# Patient Record
Sex: MALE | Race: WHITE | URBAN - METROPOLITAN AREA
[De-identification: names, ages, dates, MRNs, and addresses within clinical notes are randomized per-mention and may not be internally consistent; named-entity substitution may affect disease eponyms.]

---

## 2017-01-17 ENCOUNTER — TELEPHONE (OUTPATIENT)
Dept: FAMILY MEDICINE CLINIC | Age: 47
End: 2017-01-17

## 2017-01-17 DIAGNOSIS — Z13.1 SCREENING FOR DIABETES MELLITUS (DM): ICD-10-CM

## 2017-01-17 DIAGNOSIS — Z13.21 ENCOUNTER FOR VITAMIN DEFICIENCY SCREENING: ICD-10-CM

## 2017-01-17 DIAGNOSIS — Z13.6 SCREENING, ISCHEMIC HEART DISEASE: ICD-10-CM

## 2017-01-17 DIAGNOSIS — Z13.0 SCREENING FOR ENDOCRINE, NUTRITIONAL, METABOLIC AND IMMUNITY DISORDER: Primary | ICD-10-CM

## 2017-01-17 DIAGNOSIS — Z13.228 SCREENING FOR ENDOCRINE, NUTRITIONAL, METABOLIC AND IMMUNITY DISORDER: Primary | ICD-10-CM

## 2017-01-17 DIAGNOSIS — Z13.89 SCREENING FOR BLOOD OR PROTEIN IN URINE: ICD-10-CM

## 2017-01-17 DIAGNOSIS — Z13.29 THYROID DISORDER SCREEN: ICD-10-CM

## 2017-01-17 DIAGNOSIS — Z13.29 SCREENING FOR ENDOCRINE, NUTRITIONAL, METABOLIC AND IMMUNITY DISORDER: Primary | ICD-10-CM

## 2017-01-17 DIAGNOSIS — Z13.220 SCREENING, LIPID: ICD-10-CM

## 2017-01-17 DIAGNOSIS — Z13.21 SCREENING FOR ENDOCRINE, NUTRITIONAL, METABOLIC AND IMMUNITY DISORDER: Primary | ICD-10-CM

## 2017-01-19 NOTE — TELEPHONE ENCOUNTER
Fasting labs to be done 1 week prior to visit. Nothing but water and meds for 8 hours prior to lab draw.       Future Appointments  Date Time Provider Jodi Valdovinos   1/26/2017 9:00 AM Greg Russell MD [facility]        Orders Placed This Encounter    CBC WITH AUTOMATED DIFF     Standing Status:   Future     Standing Expiration Date:   8/36/2106    METABOLIC PANEL, COMPREHENSIVE     Standing Status:   Future     Standing Expiration Date:   7/19/2017    HEMOGLOBIN A1C WITH EAG     Standing Status:   Future     Standing Expiration Date:   1/20/2018    LIPID PANEL     Standing Status:   Future     Standing Expiration Date:   1/20/2018    URINALYSIS W/ RFLX MICROSCOPIC     Standing Status:   Future     Standing Expiration Date:   1/20/2018    TSH AND FREE T4     Standing Status:   Future     Standing Expiration Date:   1/20/2018    VITAMIN D, 25 HYDROXY     Standing Status:   Future     Standing Expiration Date:   1/20/2018

## 2017-01-24 ENCOUNTER — HOSPITAL ENCOUNTER (OUTPATIENT)
Dept: LAB | Age: 47
Discharge: HOME | End: 2017-01-24
Payer: OTHER GOVERNMENT

## 2017-01-24 DIAGNOSIS — Z13.21 SCREENING FOR ENDOCRINE, NUTRITIONAL, METABOLIC AND IMMUNITY DISORDER: ICD-10-CM

## 2017-01-24 DIAGNOSIS — Z13.0 SCREENING FOR ENDOCRINE, NUTRITIONAL, METABOLIC AND IMMUNITY DISORDER: ICD-10-CM

## 2017-01-24 DIAGNOSIS — Z13.220 SCREENING, LIPID: ICD-10-CM

## 2017-01-24 DIAGNOSIS — Z13.29 SCREENING FOR ENDOCRINE, NUTRITIONAL, METABOLIC AND IMMUNITY DISORDER: ICD-10-CM

## 2017-01-24 DIAGNOSIS — Z13.89 SCREENING FOR BLOOD OR PROTEIN IN URINE: ICD-10-CM

## 2017-01-24 DIAGNOSIS — Z13.1 SCREENING FOR DIABETES MELLITUS (DM): ICD-10-CM

## 2017-01-24 DIAGNOSIS — Z13.228 SCREENING FOR ENDOCRINE, NUTRITIONAL, METABOLIC AND IMMUNITY DISORDER: ICD-10-CM

## 2017-01-24 DIAGNOSIS — Z13.29 THYROID DISORDER SCREEN: ICD-10-CM

## 2017-01-24 DIAGNOSIS — Z13.21 ENCOUNTER FOR VITAMIN DEFICIENCY SCREENING: ICD-10-CM

## 2017-01-24 DIAGNOSIS — Z13.6 SCREENING, ISCHEMIC HEART DISEASE: ICD-10-CM

## 2017-01-24 LAB
25(OH)D3 SERPL-MCNC: 24.7 NG/ML (ref 30–100)
ALBUMIN SERPL BCP-MCNC: 4.5 G/DL (ref 3.4–5)
ALBUMIN/GLOB SERPL: 1.7 {RATIO} (ref 0.8–1.7)
ALP SERPL-CCNC: 58 U/L (ref 45–117)
ALT SERPL-CCNC: 34 U/L (ref 16–61)
ANION GAP BLD CALC-SCNC: 9 MMOL/L (ref 3–18)
APPEARANCE UR: CLEAR
AST SERPL W P-5'-P-CCNC: 19 U/L (ref 15–37)
BASOPHILS # BLD AUTO: 0 K/UL (ref 0–0.06)
BASOPHILS # BLD: 0 % (ref 0–2)
BILIRUB SERPL-MCNC: 0.8 MG/DL (ref 0.2–1)
BILIRUB UR QL: NEGATIVE
BUN SERPL-MCNC: 17 MG/DL (ref 7–18)
BUN/CREAT SERPL: 23 (ref 12–20)
CALCIUM SERPL-MCNC: 8.6 MG/DL (ref 8.5–10.1)
CHLORIDE SERPL-SCNC: 104 MMOL/L (ref 100–108)
CHOLEST SERPL-MCNC: 180 MG/DL
CO2 SERPL-SCNC: 27 MMOL/L (ref 21–32)
COLOR UR: YELLOW
CREAT SERPL-MCNC: 0.73 MG/DL (ref 0.6–1.3)
DIFFERENTIAL METHOD BLD: ABNORMAL
EOSINOPHIL # BLD: 0.2 K/UL (ref 0–0.4)
EOSINOPHIL NFR BLD: 4 % (ref 0–5)
ERYTHROCYTE [DISTWIDTH] IN BLOOD BY AUTOMATED COUNT: 13.6 % (ref 11.6–14.5)
EST. AVERAGE GLUCOSE BLD GHB EST-MCNC: 111 MG/DL
GLOBULIN SER CALC-MCNC: 2.7 G/DL (ref 2–4)
GLUCOSE SERPL-MCNC: 93 MG/DL (ref 74–99)
GLUCOSE UR STRIP.AUTO-MCNC: NEGATIVE MG/DL
HBA1C MFR BLD: 5.5 % (ref 4.2–5.6)
HCT VFR BLD AUTO: 44.9 % (ref 36–48)
HDLC SERPL-MCNC: 61 MG/DL (ref 40–60)
HDLC SERPL: 3 {RATIO} (ref 0–5)
HGB BLD-MCNC: 14.9 G/DL (ref 13–16)
HGB UR QL STRIP: NEGATIVE
KETONES UR QL STRIP.AUTO: NEGATIVE MG/DL
LDLC SERPL CALC-MCNC: 94.2 MG/DL (ref 0–100)
LEUKOCYTE ESTERASE UR QL STRIP.AUTO: NEGATIVE
LIPID PROFILE,FLP: ABNORMAL
LYMPHOCYTES # BLD AUTO: 41 % (ref 21–52)
LYMPHOCYTES # BLD: 1.8 K/UL (ref 0.9–3.6)
MCH RBC QN AUTO: 29.9 PG (ref 24–34)
MCHC RBC AUTO-ENTMCNC: 33.2 G/DL (ref 31–37)
MCV RBC AUTO: 90 FL (ref 74–97)
MONOCYTES # BLD: 0.5 K/UL (ref 0.05–1.2)
MONOCYTES NFR BLD AUTO: 11 % (ref 3–10)
NEUTS SEG # BLD: 2 K/UL (ref 1.8–8)
NEUTS SEG NFR BLD AUTO: 44 % (ref 40–73)
NITRITE UR QL STRIP.AUTO: NEGATIVE
PH UR STRIP: 6.5 [PH] (ref 5–8)
PLATELET # BLD AUTO: 162 K/UL (ref 135–420)
PMV BLD AUTO: 13 FL (ref 9.2–11.8)
POTASSIUM SERPL-SCNC: 3.9 MMOL/L (ref 3.5–5.5)
PROT SERPL-MCNC: 7.2 G/DL (ref 6.4–8.2)
PROT UR STRIP-MCNC: NEGATIVE MG/DL
RBC # BLD AUTO: 4.99 M/UL (ref 4.7–5.5)
SODIUM SERPL-SCNC: 140 MMOL/L (ref 136–145)
SP GR UR REFRACTOMETRY: 1.01 (ref 1–1.03)
T4 FREE SERPL-MCNC: 1.3 NG/DL (ref 0.7–1.5)
TRIGL SERPL-MCNC: 124 MG/DL (ref ?–150)
TSH SERPL DL<=0.05 MIU/L-ACNC: 0.96 UIU/ML (ref 0.36–3.74)
UROBILINOGEN UR QL STRIP.AUTO: 0.2 EU/DL (ref 0.2–1)
VLDLC SERPL CALC-MCNC: 24.8 MG/DL
WBC # BLD AUTO: 4.5 K/UL (ref 4.6–13.2)

## 2017-01-24 PROCEDURE — 80061 LIPID PANEL: CPT | Performed by: INTERNAL MEDICINE

## 2017-01-24 PROCEDURE — 83036 HEMOGLOBIN GLYCOSYLATED A1C: CPT | Performed by: INTERNAL MEDICINE

## 2017-01-24 PROCEDURE — 36415 COLL VENOUS BLD VENIPUNCTURE: CPT | Performed by: INTERNAL MEDICINE

## 2017-01-24 PROCEDURE — 80053 COMPREHEN METABOLIC PANEL: CPT | Performed by: INTERNAL MEDICINE

## 2017-01-24 PROCEDURE — 85025 COMPLETE CBC W/AUTO DIFF WBC: CPT | Performed by: INTERNAL MEDICINE

## 2017-01-24 PROCEDURE — 81003 URINALYSIS AUTO W/O SCOPE: CPT | Performed by: INTERNAL MEDICINE

## 2017-01-24 PROCEDURE — 84439 ASSAY OF FREE THYROXINE: CPT | Performed by: INTERNAL MEDICINE

## 2017-01-24 PROCEDURE — 82306 VITAMIN D 25 HYDROXY: CPT | Performed by: INTERNAL MEDICINE

## 2017-01-24 NOTE — PROGRESS NOTES
PRE-VISIT PLANNING:     Future Appointments  Date Time Provider Jodi Santosi   2017 9:00 AM Elizabeth Matt MD [facility] is a patient of Elizabeth Matt MD who is scheduled for an office visit with Elizabeth Matt MD on 2017 for preventive visit. Encounter opened prior to visit to complete pre-visit planning, update and review pertinent sections in the chart. Last office visit with PCP was 9/10/15. Rooming Nurse Items:  -- Offer TDAP and flu shot    Pending items for provider to review with patient:  -- Completed labs 2017 in anticipation of visit. Health Maintenance Due   Topic Date Due    DTaP/Tdap/Td series (1 - Tdap) 1992    INFLUENZA AGE 9 TO ADULT  2016          Preventive Visit - Internal [address] [address] [facility] [address]    Date of Service:  [unfilled]  Patient's Name: Dayanna Steel   Patient's :  1970     Subjective/Discussion:       Chief Complaint   Patient presents with   [facility] Physical        Dayanna Steel is a 40 y.o. male seen today for routine preventive visit/annual physical.  He  has a past medical history of Environmental allergies; Exertional dyspnea (2014); Histoplasmosis; and Testicular/scrotal pain (2014). Labs from 2017 show low vitamin D and slightly low white blood cell count. Exercising regularly, lifts weights daily and runs 1.5-3 miles every other day. Still having vas deferens pain, severity waxes and wanes. Seen by urology several times. Not ready to reverse vasectomy (his wife is premenopausal and does not want to go on hormonal contraceptive). Gets water in ears frequently, swims a lot, work takes him to [employer] frequently and he likes swimming in the ocean. Hx of histoplasmosis (grew up in [address] [place] [address] Road), noted hilar LAD on previous CXRs.   Does have remote hx of smoking, quit before age 28. Wife noted a lipoma on his back. Not painful or sore.       Review of Systems (positives in bold)   CONST:   fatigue, malaise, unintentional weight change, appetite change, fevers, chills, rigors  NEURO:   headaches, auras, vision changes, seizures,     dizziness, lightheadeness, orthostasis, loss of consciousness, confusion    numbness/tingling/sensory change, focal weakness, new gait difficulty/imbalance  HEENT:  itchy eyes, runny eyes, red eyes, eye watering or discharge,     vision changes, light sensitivity, double vision    ear pain, ear pressure/popping, ear discharge/drainage, hearing change, sensation of fluid in ears,    nosebleeds, sneezing, runny nose, nasal congestion, postnasal drip, altered sense of smell    sore throat, dry mouth,voice change, hoarse voice,      jaw pain, jaw popping, toothache, dysgeusia    difficulty swallowing, oral ulcers or canker sores  CV:      chest pain, palpitations, chest wall pain, orthopnea, PND, edema  PULM:  SOB, wheezing, cough, sputum production, hemoptysis  GI:             nausea, vomiting, dry heaves, abdominal pain,     diarrhea, constipation, greasy stools, blood in stool, pale stools  :       dysuria, frequency, urgency, hematuria, incontinence, flank pain, scrotal pain,     decreased urine output, dark urine color, malodorous urine, kidney stones  MS:      focal muscle pain, myalgias, soft tissue swelling,    focal joint pain, diffuse joint aches, joint swelling/redness,     decreased ROM, joint instability, joint crepitus    neck pain, back pain  SKIN:        rashes, itching, vesicles, pustules,     cuts, open sores, nonhealing wounds, drainage, skin growth,    acne, rosacea, changing moles or growths, skin tags, warts  ALLERGY: seasonal allergies, itchy eyes, watery eyes, red eyes,     itchy ears, ear pain/pressure, ear popping,     runny nose, sneezing, postnasal drip, sore throat  HEME:  easy bleeding/bruising, bleeding from gums, history of DVT or PE, history of ICH  PSYCH: abnormal mood swings, anxiety, insomnia, hallucinations, anhedonia,       depression, suicidal ideation, homicidal ideation, feelings of hopelessness    substance dependence or abuse, disordered eating, irritability,    difficulty focusing, distractibility, obsessions, compulsions, repetitious behaviors     Health Maintenance   Topic Date Due    DTaP/Tdap/Td series (1 - Tdap) 04/19/1992    INFLUENZA AGE 9 TO ADULT  08/01/2016       Hospital Outpatient Visit on 01/24/2017   Component Date Value Ref Range Status    WBC 01/24/2017 4.5* 4.6 - 13.2 K/uL Final    RBC 01/24/2017 4.99  4.70 - 5.50 M/uL Final    HGB 01/24/2017 14.9  13.0 - 16.0 g/dL Final    HCT 01/24/2017 44.9  36.0 - 48.0 % Final    MCV 01/24/2017 90.0  74.0 - 97.0 FL Final    MCH 01/24/2017 29.9  24.0 - 34.0 PG Final    MCHC 01/24/2017 33.2  31.0 - 37.0 g/dL Final    RDW 01/24/2017 13.6  11.6 - 14.5 % Final    PLATELET 51/81/6673 880  135 - 420 K/uL Final    MPV 01/24/2017 13.0* 9.2 - 11.8 FL Final    NEUTROPHILS 01/24/2017 44  40 - 73 % Final    LYMPHOCYTES 01/24/2017 41  21 - 52 % Final    MONOCYTES 01/24/2017 11* 3 - 10 % Final    EOSINOPHILS 01/24/2017 4  0 - 5 % Final    BASOPHILS 01/24/2017 0  0 - 2 % Final    ABS. NEUTROPHILS 01/24/2017 2.0  1.8 - 8.0 K/UL Final    ABS. LYMPHOCYTES 01/24/2017 1.8  0.9 - 3.6 K/UL Final    ABS. MONOCYTES 01/24/2017 0.5  0.05 - 1.2 K/UL Final    ABS. EOSINOPHILS 01/24/2017 0.2  0.0 - 0.4 K/UL Final    ABS.  BASOPHILS 01/24/2017 0.0  0.0 - 0.06 K/UL Final    DF 01/24/2017 AUTOMATED    Final    Sodium 01/24/2017 140  136 - 145 mmol/L Final    Potassium 01/24/2017 3.9  3.5 - 5.5 mmol/L Final    Chloride 01/24/2017 104  100 - 108 mmol/L Final    CO2 01/24/2017 27  21 - 32 mmol/L Final    Anion gap 01/24/2017 9  3.0 - 18 mmol/L Final    Glucose 01/24/2017 93  74 - 99 mg/dL Final    BUN 01/24/2017 17  7.0 - 18 MG/DL Final    Creatinine 01/24/2017 0.73  0.6 - 1.3 MG/DL Final    BUN/Creatinine ratio 01/24/2017 23* 12 - 20   Final    GFR est AA 01/24/2017 >60  >60 ml/min/1.73m2 Final    GFR est non-AA 01/24/2017 >60  >60 ml/min/1.73m2 Final    Comment: (NOTE)  Estimated GFR is calculated using the Modification of Diet in Renal   Disease (MDRD) Study equation, reported for both  Americans   (GFRAA) and non- Americans (GFRNA), and normalized to 1.73m2   body surface area. The physician must decide which value applies to   the patient. The MDRD study equation should only be used in   individuals age 25 or older. It has not been validated for the   following: pregnant women, patients with serious comorbid conditions,   or on certain medications, or persons with extremes of body size,   muscle mass, or nutritional status.  Calcium 01/24/2017 8.6  8.5 - 10.1 MG/DL Final    Bilirubin, total 01/24/2017 0.8  0.2 - 1.0 MG/DL Final    ALT 01/24/2017 34  16 - 61 U/L Final    AST 01/24/2017 19  15 - 37 U/L Final    Alk. phosphatase 01/24/2017 58  45 - 117 U/L Final    Protein, total 01/24/2017 7.2  6.4 - 8.2 g/dL Final    Albumin 01/24/2017 4.5  3.4 - 5.0 g/dL Final    Globulin 01/24/2017 2.7  2.0 - 4.0 g/dL Final    A-G Ratio 01/24/2017 1.7  0.8 - 1.7   Final    Hemoglobin A1c 01/24/2017 5.5  4.2 - 5.6 % Final    Comment: (NOTE)  HbA1C Interpretive Ranges  <5.7              Normal  5.7 - 6.4         Consider Prediabetes  >6.5              Consider Diabetes      Est. average glucose 01/24/2017 111  mg/dL Final    Comment: (NOTE)  The eAG should be interpreted with patient characteristics in mind   since ethnicity, interindividual differences, red cell lifespan,   variation in rates of glycation, etc. may affect the validity of the   calculation.       LIPID PROFILE 01/24/2017        Final    Cholesterol, total 01/24/2017 180  <200 MG/DL Final    Triglyceride 01/24/2017 124  <150 MG/DL Final    Comment: The drugs N-acetylcysteine (NAC) and  Metamiszole have been found to cause falsely  low results in this chemical assay. Please  be sure to submit blood samples obtained  BEFORE administration of either of these  drugs to assure correct results.  HDL Cholesterol 01/24/2017 61* 40 - 60 MG/DL Final    LDL, calculated 01/24/2017 94.2  0 - 100 MG/DL Final    VLDL, calculated 01/24/2017 24.8  MG/DL Final    CHOL/HDL Ratio 01/24/2017 3.0  0 - 5.0   Final    Color 01/24/2017 YELLOW    Final    Appearance 01/24/2017 CLEAR    Final    Specific gravity 01/24/2017 1.013  1.005 - 1.030   Final    pH (UA) 01/24/2017 6.5  5.0 - 8.0   Final    Protein 01/24/2017 NEGATIVE   NEG mg/dL Final    Glucose 01/24/2017 NEGATIVE   NEG mg/dL Final    Ketone 01/24/2017 NEGATIVE   NEG mg/dL Final    Bilirubin 01/24/2017 NEGATIVE   NEG   Final    Blood 01/24/2017 NEGATIVE   NEG   Final    Urobilinogen 01/24/2017 0.2  0.2 - 1.0 EU/dL Final    Nitrites 01/24/2017 NEGATIVE   NEG   Final    Leukocyte Esterase 01/24/2017 NEGATIVE   NEG   Final    TSH 01/24/2017 0.96  0.36 - 3.74 uIU/mL Final    T4, Free 01/24/2017 1.3  0.7 - 1.5 NG/DL Final    Vitamin D 25-Hydroxy 01/24/2017 24.7* 30 - 100 ng/mL Final    Comment: (NOTE)  Deficiency               <20 ng/mL  Insufficiency          20-30 ng/mL  Sufficient             ng/mL  Possible toxicity       >100 ng/mL    The Method used is Siemens Advia Centaur currently standardized to a   Center of Disease Control and Prevention (CDC) certified reference   [facility]. Samples containing fluorescein dye can produce falsely   elevated values when tested with the ADVIA Centaur Vitamin D Assay. It is recommended that results in the toxic range, >100 ng/mL, be   retested 72 hours post fluorescein exposure.          Physical Assessment:   VS:    Visit Vitals    /84    Pulse 63    Temp 98 °F (36.7 °C) (Oral)    Resp 16    Ht 6' 2\" (1.88 m)    Wt 227 lb (103 kg)    SpO2 95%    BMI 29.15 kg/m2     Vision Screening Comments: Pt has eye's checked every 6 months. General:   Pleasant male well-nourished, well-groomed,  conversant, alert, in no acute distress. Head:  Normocephalic, atraumatic  Ears:  External ears WNL, no pain with movement of pinna, no TTP of mastoid processes    External auditory canals are clear    TMs WNL bilaterally with no bulging, or erythema, no medial effusions present  Eyes:  EOMI, PERRL, no pain with eye movements    No conjunctival injection, abnormal tearing, discharge, chemosis  Mouth:  MMM, o/p WNL without membranes, exudates, ulcerations or petechiae  Nose:  External nares WNL  Neck:  Neck supple with normal ROM for age, no thyromegaly or nodules    no LAD  Cardiovasc:   Regular rate and rhythm, no murmurs, no rubs, no gallops,  Pulmonary:   Clear breath sounds bilaterally, good air movement    No wheezing, no rales, no rhonchi, normal respiratory effort  Abdomen:   Abdomen soft, nontender, nondistended, NABS, no masses  Extremities:   No edema, no tenderness with palpation of calves, warm and well-perfused distally    No synovitis or pain with ROM of knees, ankles, hips, wrists, elbows, shoulders. Neuro:   Alert, conversant, appropriate, following commands, no focal deficits. DTRs 2+/symmetric at biceps, BRs, knees, ankles    Strength testing 5/5 in all major muscle groups    Gait/balance normal     Sensation normal to light touch distally  Skin:    No rashes noted. Psych:  Affect is pleasant, mood and judgment appropriate, facies congruent with affect,    Thought process demonstrated is linear and logical    Assessment/Plan:       Crystal Wagner was seen today for routine preventive visit. Follow up CXR for hilar LAD noted on previous imaging, not mentioned by radiologist.  Hx of histoplasmosis. No new breathing symptoms. Labs and recommendations reviewed with the patient.    Reassured patient re: benign nature of lipoma, if becomes symptomatic he will let me know and I will refer to surgery for removal.  Patient opting to defer vasectomy reversal for now. He will schedule with urology if he changes his mind. Age appropriate preventive screenings/measures discussed with patient. Follow up in 3-6 months for recheck of blood counts and vitamin D, yearly for preventive visit, sooner PRN. ICD-10-CM ICD-9-CM    1. Routine adult health maintenance Z00.00 V70.0    2. Scrotal pain N50.82 608.9    3. Histoplasmosis B39.9 115.90 XR CHEST PA LAT   4. Vitamin D deficiency E55.9 268.9 cholecalciferol (VITAMIN D3) 5,000 unit capsule   5. Leukopenia, unspecified type D72.819 288.50    6. Encounter for immunization Z23 V03.89 INFLUENZA VIRUS VAC QUAD,SPLIT,PRESV FREE SYRINGE 3/> YRS IM      TETANUS, DIPHTHERIA TOXOIDS AND ACELLULAR PERTUSSIS VACCINE (TDAP), IN INDIVIDS. >=7, IM      [place] IMMUNIZ ADMIN,1 SINGLE/COMB VAC/TOXOID       Body mass index is 29.15 kg/(m^2). Discussed the patient's above normal BMI with him. I have recommended the following interventions and follow up:  Weight loss from baseline weight. 30 minutes of cardiovascular exercise daily, reduction in simple carbohydrates, increase in dietary fiber, adequate water intake to stay hydrated/keep urine clear to light yellow. Follow up at next OV. The patient states understanding/agreement with the treatment plan. All questions were answered. Marcella Ricks MD - Internal Medicine  1/26/2017, 8:41 AM  3 [facility]    Patient Care Team:  Marcella Ricks MD as PCP - General (Internal Medicine)     Problem List:      Patient Active Problem List   Diagnosis Code    Testicular/scrotal pain [id number]    Exertional dyspnea R06.09    Histoplasmosis B39.9    Environmental allergies Z91.09       Medications:     No current outpatient prescriptions on file. No current facility-administered medications for this visit.         Additional History:     Past Medical History   Diagnosis Date    Environmental allergies     Exertional dyspnea 6/2/2014     ? related to Histo of lungs    Histoplasmosis      History of histoplasmosis of lung per patient, notes he has had abnormal XRays in [facility]    Testicular/scrotal pain 6/2/2014     Reversal of vasectomy recommended by [facility]     Past Surgical History   Procedure Laterality Date    Hx vasectomy  2012    Hx hernia repair  2013     bilateral with mesh    Hx heent  2006     wisdom extraction     Social History     Social History    Marital status:      Spouse name: N/A    Number of children: N/A    Years of education: N/A     Occupational History     for [employer]      Social History Main Topics    Smoking status: Never Smoker    Smokeless tobacco: Never Used    Alcohol use Yes      Comment: 2/wk    Drug use: No    Sexual activity: Yes     Partners: Female     Other Topics Concern    Not on file     Social History Narrative    8/14/2015:  Wife Jasmin Pérez is a NP. 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 Carole Drew on [facility].            Family History   Problem Relation Age of Onset    Diabetes Mother      Allergies   Allergen Reactions    Shellfish Derived Anaphylaxis    Amoxicillin Nausea Only     Stomach pain and upset            This document may have been created with the aid of dictation software. In spite of review and editing, text may contain errors, particularly phonetic errors.

## 2017-01-26 ENCOUNTER — OFFICE VISIT (OUTPATIENT)
Dept: FAMILY MEDICINE CLINIC | Age: 47
End: 2017-01-26

## 2017-01-26 VITALS
TEMPERATURE: 98 F | HEART RATE: 63 BPM | RESPIRATION RATE: 16 BRPM | BODY MASS INDEX: 29.13 KG/M2 | DIASTOLIC BLOOD PRESSURE: 84 MMHG | OXYGEN SATURATION: 95 % | SYSTOLIC BLOOD PRESSURE: 120 MMHG | HEIGHT: 74 IN | WEIGHT: 227 LBS

## 2017-01-26 DIAGNOSIS — D17.1 LIPOMA OF BACK: ICD-10-CM

## 2017-01-26 DIAGNOSIS — E55.9 VITAMIN D DEFICIENCY: ICD-10-CM

## 2017-01-26 DIAGNOSIS — D72.819 LEUKOPENIA, UNSPECIFIED TYPE: ICD-10-CM

## 2017-01-26 DIAGNOSIS — N50.82 SCROTAL PAIN: Chronic | ICD-10-CM

## 2017-01-26 DIAGNOSIS — Z23 ENCOUNTER FOR IMMUNIZATION: ICD-10-CM

## 2017-01-26 DIAGNOSIS — Z00.00 ROUTINE ADULT HEALTH MAINTENANCE: Primary | ICD-10-CM

## 2017-01-26 DIAGNOSIS — B39.9 HISTOPLASMOSIS: Chronic | ICD-10-CM

## 2017-01-26 DIAGNOSIS — R59.0 HILAR LYMPHADENOPATHY: ICD-10-CM

## 2017-01-26 RX ORDER — CHOLECALCIFEROL (VITAMIN D3) 125 MCG
5000 CAPSULE ORAL DAILY
Qty: 90 CAP | Refills: 3 | COMMUNITY
Start: 2017-01-26

## 2017-01-26 NOTE — MR AVS SNAPSHOT
Visit Information Date & Time Provider Department Dept. Phone Encounter #  
 1/26/2017  9:00 AM Samir Alex, 3 [facility] [phone] [id number] Upcoming Health Maintenance Date Due DTaP/Tdap/Td series (1 - Tdap) 4/19/1992 INFLUENZA AGE 9 TO ADULT 8/1/2016 Allergies as of 1/26/2017  Review Complete On: 1/26/2017 By: Samir Johnson MD  
  
 Severity Noted Reaction Type Reactions Shellfish Derived High 06/02/2014   Intolerance Anaphylaxis Amoxicillin  09/10/2015    Nausea Only Stomach pain and upset Current Immunizations  Reviewed on 1/24/2017 Name Date Influenza Vaccine (Quad) PF  Incomplete Tdap  Incomplete Reviewed by Samir Johnson MD on 1/24/2017 at  8:41 AM  
You Were Diagnosed With   
  
 Codes Comments Routine adult health maintenance    -  Primary ICD-10-CM: Z00.00 ICD-9-CM: V70.0 Scrotal pain     ICD-10-CM: N50.82 ICD-9-CM: 608.9 Histoplasmosis     ICD-10-CM: B39.9 ICD-9-CM: 115.90 Vitamin D deficiency     ICD-10-CM: E55.9 ICD-9-CM: 268.9 Leukopenia, unspecified type     ICD-10-CM: D72.819 ICD-9-CM: 288.50 Encounter for immunization     ICD-10-CM: J92 ICD-9-CM: V03.89 Vitals BP Pulse Temp Resp Height(growth percentile) Weight(growth percentile) 120/84 63 98 °F (36.7 °C) (Oral) 16 6' 2\" (1.88 m) 227 lb (103 kg) SpO2 BMI Smoking Status 95% 29.15 kg/m2 Never Smoker Vitals History BMI and BSA Data Body Mass Index Body Surface Area  
 29.15 kg/m 2 2.32 m 2 Preferred Pharmacy Pharmacy Name Phone [facility] [phone] Your Updated Medication List  
  
   
This list is accurate as of: 1/26/17  9:36 AM.  Always use your most recent med list.  
  
  
  
  
 cholecalciferol 5,000 unit capsule Commonly known as:  VITAMIN D3  
 Take 1 Cap by mouth daily. Indications: VITAMIN D DEFICIENCY We Performed the Following INFLUENZA VIRUS VAC QUAD,SPLIT,PRESV FREE SYRINGE 3/> YRS IM [id number] CPT(R)] [place] IMMUNIZ ADMIN,1 SINGLE/COMB VAC/TOXOID [id number] CPT(R)] TETANUS, DIPHTHERIA TOXOIDS AND ACELLULAR PERTUSSIS VACCINE (TDAP), IN INDIVIDS. >=7, IM [id number] CPT(R)] To-Do List   
 01/26/2017 Imaging:  XR CHEST PA LAT Patient Instructions STAY UP TO DATE WITH YOUR PREVENTIVE HEALTH:    
Please review the following recommendations and if you are not sure that your healthcare is up to date, ask your provider at your next scheduled office visit. -- Yearly preventive visits (sometimes called \"physicals\") are recommended for all adults. Medicare and Medicaid do not pay for physicals. Medicare covers a yearly wellness visit that differs in many ways from a traditional physical, but is an opportunity to make sure you are maximizing the preventive services that will be covered by Medicare. Each insurance carrier will have different regulations about what services may be covered, so be sure to talk with your insurance provider before scheduling a visit. -- Colon cancer screening is recommended for all patients 48 and older with either colonoscopy (interval will vary depending on results) or yearly stool testing.   
 
-- Mammogram is recommended every 2 years for women ages 36 to 76. -- Pap smear is recommended every 3-5 years for women aged 24 to 72, unless your cervix has been surgically removed for benign reasons. -- Flu shot is recommended every fall for adults of all ages. -- Prevnar 15 is recommended at the age of 72 for all adults. -- Pneumovax is recommended one year after Prevnar 13 for all adults, but earlier if you have a history of chronic health problems (including diabetes and asthma) or smoking. -- Tetanus boosters are recommended every 10 years for adults of all ages. Medicare and Medicaid do not cover tetanus as a preventive booster, but will pay for patients to receive a booster in the event of certain injuries. INSTRUCTIONS AFTER YOUR VISIT ON 1/26/2017  
 
-- You should start a once daily vitamin D supplement (5000 units daily), available over the counter. -- Plan to recheck your blood counts and Vitamin D level in 3-6 months. X-RAYS were ordered today. The  will direct you to our X-ray suite. The radiologist will provide a report in 24-48 hours. If you want a copy of your images, you can request a CD copy from the X-ray technician. -- Plan to start colonoscopy screening for colon cancer at age 48. TESTING RESULTS  
-- Lab results may become available for your review on [employer] before your provider has an opportunity to write you a note about results. Review of routine lab results will generally be done in 10-14 days. Please save your inquiries about results until your provider has had time to review them. -- If you have testing done outside of the office, please call to let us know the date and location that your testing was completed. Results can often be obtained faster if an inquiry is run. MEDICATION REFILLS   
 
-- Controlled substance refills (medications that require printed prescriptions for monitoring of use per [facility] guidelines) must be picked up in the office and per medical group policy will require a scheduled office visit with the provider. Calling the after hours answering service to request a controlled substance represents a violation of [facility] controlled substance policy. -- All other medication refills are to be requested by calling your pharmacy during regular office hours. The after hours physician on call is not required to respond to calls about medication refills.   It is your responsibility to keep track of when you may be running out of medication and to place refill requests at least 3 business days prior. [facility] Scheduling appointments can be done at the  or by calling [phone] and selecting option #1. HOLIDAY CLOSURES:  
 
The office is closed on six major holidays each year:  New Year's Day, July 4th, Memorial Day, Labor Day, Thanksgiving, and [place] Day. Lab and X-ray services are not available on those days. Introducing [address]! Carol Almonte introduces [employer] patient portal. Now you can access parts of your medical record, email your doctor's office, and request medication refills online. 1. In your internet browser, go to [email or web address] 2. Click on the First Time User? Click Here link in the Sign In box. You will see the New Member Sign Up page. 3. Enter your [employer] Access Code exactly as it appears below. You will not need to use this code after youve completed the sign-up process. If you do not sign up before the expiration date, you must request a new code. · [employer] Access Code: [email or web address] Expires: 4/26/2017  9:36 AM 
 
4. Enter the last four digits of your Social Security Number (xxxx) and Date of Birth (mm/dd/yyyy) as indicated and click Submit. You will be taken to the next sign-up page. 5. Create a [employer] ID. This will be your [employer] login ID and cannot be changed, so think of one that is secure and easy to remember. 6. Create a [employer] password. You can change your password at any time. 7. Enter your Password Reset Question and Answer. This can be used at a later time if you forget your password. 8. Enter your e-mail address. You will receive e-mail notification when new information is available in [address] 9. Click Sign Up. You can now view and download portions of your medical record. 10. Click the Download Summary menu link to download a portable copy of your medical information. If you have questions, please visit the Frequently Asked Questions section of the [employer] website. Remember, [employer] is NOT to be used for urgent needs. For medical emergencies, dial 911. Now available from your iPhone and Android! Please provide this summary of care documentation to your next provider. Your primary care clinician is listed as Alana Cannon. If you have any questions after today's visit, please call [phone].

## 2017-01-26 NOTE — PATIENT INSTRUCTIONS
STAY UP TO DATE WITH YOUR PREVENTIVE HEALTH:     Please review the following recommendations and if you are not sure that your healthcare is up to date, ask your provider at your next scheduled office visit. -- Yearly preventive visits (sometimes called \"physicals\") are recommended for all adults. Medicare and Medicaid do not pay for physicals. Medicare covers a yearly wellness visit that differs in many ways from a traditional physical, but is an opportunity to make sure you are maximizing the preventive services that will be covered by Medicare. Each insurance carrier will have different regulations about what services may be covered, so be sure to talk with your insurance provider before scheduling a visit. -- Colon cancer screening is recommended for all patients 48 and older with either colonoscopy (interval will vary depending on results) or yearly stool testing.      -- Mammogram is recommended every 2 years for women ages 36 to 76. -- Pap smear is recommended every 3-5 years for women aged 24 to 72, unless your cervix has been surgically removed for benign reasons. -- Flu shot is recommended every fall for adults of all ages. -- Prevnar 15 is recommended at the age of 72 for all adults. -- Pneumovax is recommended one year after Prevnar 13 for all adults, but earlier if you have a history of chronic health problems (including diabetes and asthma) or smoking. -- Tetanus boosters are recommended every 10 years for adults of all ages. Medicare and Medicaid do not cover tetanus as a preventive booster, but will pay for patients to receive a booster in the event of certain injuries. INSTRUCTIONS AFTER YOUR VISIT ON 1/26/2017     -- You should start a once daily vitamin D supplement (5000 units daily), available over the counter. -- Plan to recheck your blood counts and Vitamin D level in 3-6 months. X-RAYS were ordered today.   The  will direct you to our X-ray suite.  The radiologist will provide a report in 24-48 hours. If you want a copy of your images, you can request a CD copy from the X-ray technician. -- Plan to start colonoscopy screening for colon cancer at age 48. TESTING RESULTS   -- Lab results may become available for your review on Verid before your provider has an opportunity to write you a note about results. Review of routine lab results will generally be done in 10-14 days. Please save your inquiries about results until your provider has had time to review them. -- If you have testing done outside of the office, please call to let us know the date and location that your testing was completed. Results can often be obtained faster if an inquiry is run. MEDICATION REFILLS      -- Controlled substance refills (medications that require printed prescriptions for monitoring of use per [facility] guidelines) must be picked up in the office and per medical group policy will require a scheduled office visit with the provider. Calling the after hours answering service to request a controlled substance represents a violation of [facility] controlled substance policy. -- All other medication refills are to be requested by calling your pharmacy during regular office hours. The after hours physician on call is not required to respond to calls about medication refills. It is your responsibility to keep track of when you may be running out of medication and to place refill requests at least 3 business days prior. [facility]      Scheduling appointments can be done at the  or by calling [phone] and selecting option #1. HOLIDAY CLOSURES:     The office is closed on six major holidays each year:  New Year's Day, July 4th, Memorial Day, Labor Day, Thanksgiving, and Namita Day. Lab and X-ray services are not available on those days.

## 2017-01-26 NOTE — PROGRESS NOTES
Nancy Brownlee is a 40 y.o. male  Pt here today for physical visit. 1. Have you been to the ER, urgent care clinic since your last visit? Hospitalized since your last visit? No    2. Have you seen or consulted any other health care providers outside of the [address] [place] [address] since your last visit? Include any pap smears or colon screening.  No

## 2017-05-04 ENCOUNTER — OFFICE VISIT (OUTPATIENT)
Dept: FAMILY MEDICINE CLINIC | Age: 47
End: 2017-05-04

## 2017-05-04 VITALS
WEIGHT: 231 LBS | SYSTOLIC BLOOD PRESSURE: 126 MMHG | HEART RATE: 72 BPM | TEMPERATURE: 96.4 F | DIASTOLIC BLOOD PRESSURE: 86 MMHG | RESPIRATION RATE: 17 BRPM | BODY MASS INDEX: 29.65 KG/M2 | OXYGEN SATURATION: 96 % | HEIGHT: 74 IN

## 2017-05-04 DIAGNOSIS — S46.212A TRAUMATIC PARTIAL TEAR OF BICEPS TENDON, LEFT, INITIAL ENCOUNTER: Primary | ICD-10-CM

## 2017-05-04 PROBLEM — S46.119A TRAUMATIC PARTIAL TEAR OF BICEPS TENDON: Status: ACTIVE | Noted: 2017-05-04

## 2017-05-04 NOTE — PATIENT INSTRUCTIONS
-- Partial (grade 1) tear of left biceps tendon. -- No weight lifting for 4-6 weeks, expect pain to improve in 4-6 weeks. Use pain as your guide - if it hurts, stop doing it.

## 2017-05-04 NOTE — PROGRESS NOTES
PRE-VISIT PLANNING:     Future Appointments  Date Time Provider Jodi Valdovinos   2017 9:15 AM Demarcus Aldrich MD [facility] (PCP is Demarcus Aldrich MD) is scheduled for an office visit with Demarcus Aldrich MD on 2017 for patient c/o pain in left arm. Encounter opened prior to visit to complete pre-visit planning, update and review pertinent sections in the chart. Last office visit with PCP was 2017. Pending items for provider to review with patient:    There are no preventive care reminders to display for this patient. Internal Medicine/Primary Care  Acute Care Visit  [address] , [facility] [place] [address], [address] [place] [address]  Phone [phone]  Fax [phone]    Date of Service:  2017  Patient's Name: Lonnie Stapleton   Patient's :  1970     Subjective/Discussion:        Chief Complaint   Patient presents with    Arm Pain     Left        Lonnie Stapleton is a pleasant 56 y.o. male patient of Demarcus Aldrich MD presenting for evaluation of left biceps pain. 1 week ago had been lifting weights (bench pressing) and had increased weights - no issue during workout. After workout he was pulling cover off jacuzzi and arm was in a slightly awkward position - he heard a very loud snap in left forearm near elbow. No immediate pain. Had normal movement, never swelled up, never turned black and blue. In past few days has been having worsening pain in left biceps/upper arm, none in elbow joint, none in forearm, states ROM/movement preserved, but pain is worsening. Flexing/krishna bicep is painful.         Objective:     VS:    Visit Vitals    /86 (BP 1 Location: Left arm, BP Patient Position: Sitting)    Pulse 72    Temp 96.4 °F (35.8 °C) (Oral)    Resp 17    Ht 6' 2\" (1.88 m)    Wt 231 lb (104.8 kg)    SpO2 96%    BMI 29.66 kg/m2       General:   Pleasant age appropriate male, seated comfortably in exam room, appears well, well-groomed, conversant, alert, in no acute distress. Upper extremities: Relative weakness and pain with contraction of left biceps vs right, ?minor loss of biceps muscle definition/biciptal groove left side vs right, no palpable mass/torn muscle, moderate TTP along biceps muscle left arm, weakness and pain with resisted supination of left hand    Assessment/Plan:       Bryce Felipe was seen today for left biceps partial tendon tear, preserved ROM - nondominant arm. Rest, NSAIDS, icing - no weight lifting x 4-6 weeks. Traumatic partial tear of biceps tendon, left, initial encounter      The patient states understanding and agrees with the treatment plan. All questions were answered. Lise Bustos MD - Internal Medicine  5/4/2017, 7:56 AM    Patient Care Team:  Lise Bustso MD as PCP - General (Internal Medicine)      Additional History     Past Medical History:   Diagnosis Date    Environmental allergies     Exertional dyspnea 6/2/2014    ? related to Histo of lungs    Histoplasmosis     History of histoplasmosis of lung per patient, notes he has had abnormal XRays in [place] [facility]/[place]    Lipoma of back 01/26/2017    Small nontender 3 cm subcu mass palpable left mid-thoracic     Testicular/scrotal pain 6/2/2014    Reversal of vasectomy recommended by [facility] Traumatic partial tear of biceps tendon 5/4/2017     Past Surgical History:   Procedure Laterality Date    HX HEENT  2006    wisdom extraction    HX HERNIA REPAIR  2013    bilateral with mesh    HX VASECTOMY  2012     Social History   Substance Use Topics    Smoking status: Never Smoker    Smokeless tobacco: Never Used    Alcohol use Yes      Comment: 2/wk     Current Outpatient Prescriptions   Medication Sig    cholecalciferol (VITAMIN D3) 5,000 unit capsule Take 1 Cap by mouth daily.  Indications: VITAMIN D DEFICIENCY     No current facility-administered medications for this visit. Allergies   Allergen Reactions    Shellfish Derived Anaphylaxis    Amoxicillin Nausea Only     Stomach pain and upset            This document may have been created with the aid of dictation software. In spite of review and editing, text may contain errors, particularly phonetic errors.

## 2017-05-04 NOTE — PROGRESS NOTES
1. Have you been to the ER, urgent care clinic since your last visit? Hospitalized since your last visit? No    2. Have you seen or consulted any other health care providers outside of the [address] [place] [address] since your last visit? Include any pap smears or colon screening.  No

## 2017-05-04 NOTE — MR AVS SNAPSHOT
Visit Information Date & Time Provider Department Dept. Phone Encounter #  
 5/4/2017  9:15 AM Agapito Balderas, [place] [phone] [id number] Upcoming Health Maintenance Date Due Pneumococcal 19-64 Highest Risk (1 of 3 - PCV13) 5/4/2018* INFLUENZA AGE 9 TO ADULT 8/1/2017 DTaP/Tdap/Td series (2 - Td) 1/26/2027 *Topic was postponed. The date shown is not the original due date. Allergies as of 5/4/2017  Review Complete On: 5/4/2017 By: Agapito Balderas MD  
  
 Severity Noted Reaction Type Reactions Shellfish Derived High 06/02/2014   Intolerance Anaphylaxis Amoxicillin  09/10/2015    Nausea Only Stomach pain and upset Current Immunizations  Reviewed on 5/4/2017 Name Date Influenza Vaccine (Quad) PF 1/26/2017  9:40 AM  
 Tdap 1/26/2017  9:41 AM  
  
 Reviewed by Agapito Balderas MD on 5/4/2017 at  7:55 AM  
Vitals BP Pulse Temp Resp Height(growth percentile) Weight(growth percentile) 126/86 (BP 1 Location: Left arm, BP Patient Position: Sitting) 72 96.4 °F (35.8 °C) (Oral) 17 6' 2\" (1.88 m) 231 lb (104.8 kg) SpO2 BMI Smoking Status 96% 29.66 kg/m2 Never Smoker Vitals History BMI and BSA Data Body Mass Index Body Surface Area  
 29.66 kg/m 2 2.34 m 2 Preferred Pharmacy Pharmacy Name Phone [facility], [address] [phone] Your Updated Medication List  
  
   
This list is accurate as of: 5/4/17 10:20 AM.  Always use your most recent med list.  
  
  
  
  
 cholecalciferol 5,000 unit capsule Commonly known as:  VITAMIN D3 Take 1 Cap by mouth daily. Indications: VITAMIN D DEFICIENCY Patient Instructions -- Partial (grade 1) tear of left biceps tendon. -- No weight lifting for 4-6 weeks, expect pain to improve in 4-6 weeks. Use pain as your guide - if it hurts, stop doing it. Introducing [facility] & HEALTH SERVICES! Khoa Senior introduces [employer] patient portal. Now you can access parts of your medical record, email your doctor's office, and request medication refills online. 1. In your internet browser, go to [email or web address] and Playt 2. Click on the First Time User? Click Here link in the Sign In box. You will see the New Member Sign Up page. 3. Enter your [employer] Access Code exactly as it appears below. You will not need to use this code after youve completed the sign-up process. If you do not sign up before the expiration date, you must request a new code. · [employer] Access Code: [email or web address] Expires: 8/2/2017 10:19 AM 
 
4. Enter the last four digits of your Social Security Number (xxxx) and Date of Birth (mm/dd/yyyy) as indicated and click Submit. You will be taken to the next sign-up page. 5. Create a [employer] ID. This will be your [employer] login ID and cannot be changed, so think of one that is secure and easy to remember. 6. Create a [employer] password. You can change your password at any time. 7. Enter your Password Reset Question and Answer. This can be used at a later time if you forget your password. 8. Enter your e-mail address. You will receive e-mail notification when new information is available in [address] 9. Click Sign Up. You can now view and download portions of your medical record. 10. Click the Download Summary menu link to download a portable copy of your medical information. If you have questions, please visit the Frequently Asked Questions section of the [employer] website. Remember, [employer] is NOT to be used for urgent needs. For medical emergencies, dial 911. Now available from your iPhone and Android! Please provide this summary of care documentation to your next provider. Your primary care clinician is listed as Rue Kade. If you have any questions after today's visit, please call [phone].

## 2017-09-21 ENCOUNTER — OFFICE VISIT (OUTPATIENT)
Dept: FAMILY MEDICINE CLINIC | Age: 47
End: 2017-09-21

## 2017-09-21 VITALS
RESPIRATION RATE: 18 BRPM | DIASTOLIC BLOOD PRESSURE: 74 MMHG | BODY MASS INDEX: 29.49 KG/M2 | TEMPERATURE: 96.6 F | OXYGEN SATURATION: 96 % | SYSTOLIC BLOOD PRESSURE: 122 MMHG | HEART RATE: 75 BPM | HEIGHT: 74 IN | WEIGHT: 229.8 LBS

## 2017-09-21 DIAGNOSIS — R35.0 URINARY FREQUENCY: Primary | ICD-10-CM

## 2017-09-21 DIAGNOSIS — K06.8 PAIN IN GUMS: ICD-10-CM

## 2017-09-21 DIAGNOSIS — H92.02 EAR DISCOMFORT, LEFT: ICD-10-CM

## 2017-09-21 DIAGNOSIS — H61.23 BILATERAL IMPACTED CERUMEN: ICD-10-CM

## 2017-09-21 DIAGNOSIS — Z23 ENCOUNTER FOR IMMUNIZATION: ICD-10-CM

## 2017-09-21 LAB
BILIRUB UR QL STRIP: NEGATIVE
GLUCOSE UR-MCNC: NEGATIVE MG/DL
KETONES P FAST UR STRIP-MCNC: NEGATIVE MG/DL
PH UR STRIP: 6.5 [PH] (ref 4.6–8)
PROT UR QL STRIP: NEGATIVE MG/DL
SP GR UR STRIP: 1.01 (ref 1–1.03)
UA UROBILINOGEN AMB POC: NORMAL (ref 0.2–1)
URINALYSIS CLARITY POC: CLEAR
URINALYSIS COLOR POC: YELLOW
URINE BLOOD POC: NEGATIVE
URINE LEUKOCYTES POC: NEGATIVE
URINE NITRITES POC: NEGATIVE

## 2017-09-21 NOTE — PROGRESS NOTES
3 [facility]  Primary Care Office Visit - Problem-Oriented    : 1970   Ghanshyam Gonzalez is a 56 y.o. male presenting for  Chief Complaint   Patient presents with    Gum Problem     infection    Urinary Frequency    Referral Request     Urology wants to have prostate checked       Assessment/Plan:       ICD-10-CM ICD-9-CM   1. Urinary frequency - initial encounter, IPSS = 30  - referred to urology per patient request  - refrained from ordering PSA, will defer to urology     R35.0 788.41   2. Ear discomfort, left - initial encounter, likely 2/2 #3 H92.02 388.70   3. Bilateral impacted cerumen - initial encounter  - disimpacted manually  - advised cerumen softening techniques, avoid q-tip use     H61.23 380.4   4. Pain in gums - initial encounter  - no current evidence of infection  - recommend f/u with dentist if ongoing     K06.8 523.9   5. Encounter for immunization - flu shot Z23 V03.89       Orders Placed This Encounter    CULTURE, URINE     Standing Status:   Future     Standing Expiration Date:   2018    Influenza virus vaccine (QUADRIVALENT PRES FREE SYRINGE) IM (22516)    URINALYSIS W/MICROSCOPIC     Standing Status:   Future     Standing Expiration Date:   3/21/2018    REFERRAL TO UROLOGY     Referral Priority:   Routine     Referral Type:   Consultation     Referral Reason:   Specialty Services Required     Referred to Provider:   Illene Duane, MD    AMB POC URINALYSIS DIP STICK AUTO W/O MICRO         This document may have been created with the aid of dictation software. Text may contain errors, particularly phonetic errors. Reviewed management plan & instructions with patient, who voiced understanding.     Laureano Dumont MD  Internal Medicine, Family Medicine & Sports Medicine  2017, 7:56 AM    History:   Ghanshyam Gonzalez is a 56 y.o. male presenting to address:  Chief Complaint   Patient presents with    Gum Problem     infection    Urinary Frequency  Referral Request     Urology wants to have prostate checked       \"I have to pee all the time, I have to push harder to pee. \"  No dysuria. [id number] urination at night!! \"I never sleep well, obviously. 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 \"    Gum pain, on the left mandibular area, with pimple growing above it. On 9/13/2017 started amoxicillin (from dentist) x 3 pills, which gave him diarrhea, headache, etc.  Since then he has not taken any other antibiotics. No fevers or chills. + left ear discomfort, no pain  Uses earplugs when diving  Frequently in oceans & pools  Admits to q-tip use    I-PSS score = 30  Answered \"3\" for \"If you were to spend the rest of your life with your urinary condition just the way it is now, how would you feel about that? \"  [see scanned document]      Past Medical History:   Diagnosis Date    Environmental allergies     Exertional dyspnea 6/2/2014    ? related to Histo of lungs    Histoplasmosis     History of histoplasmosis of lung per patient, notes he has had abnormal [facility]Rays in [facility]    Lipoma of back 01/26/2017    Small nontender 3 cm subcu mass palpable left mid-thoracic     Testicular/scrotal pain 6/2/2014    Reversal of vasectomy recommended by [facility] Traumatic partial tear of biceps tendon 5/4/2017     Past Surgical History:   Procedure Laterality Date    HX HEENT  2006    wisdom extraction    HX HERNIA REPAIR  2013    bilateral with mesh    HX VASECTOMY  2012      reports that he has never smoked. He has never used smokeless tobacco. He reports that he drinks alcohol. He reports that he does not use illicit drugs. Social History     Social History Narrative    8/14/2015:  Wife Malia Gonzalez is a NP. 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 Brianna Nelson on [facility].            History   Smoking Status    Never Smoker   Smokeless Tobacco    Never Used     Family History   Problem Relation Age of Onset    Diabetes Mother      Allergies   Allergen Reactions    Shellfish Derived Anaphylaxis    Amoxicillin Nausea Only     Stomach pain and upset       Problem List:      Patient Active Problem List    Diagnosis    Traumatic partial tear of biceps tendon    Lipoma of back    Scrotal pain    Exertional dyspnea    Histoplasmosis     History of histoplasmosis of lung per patient, notes he has had abnormal XRays in [facility]      Environmental allergies       Medications:     Current Outpatient Prescriptions   Medication Sig    cholecalciferol (VITAMIN D3) 5,000 unit capsule Take 1 Cap by mouth daily. Indications: VITAMIN D DEFICIENCY     No current facility-administered medications for this visit. Review of Systems:     Review of Systems   Constitutional: Negative for chills and fever. HENT: Positive for ear pain (left). Gastrointestinal: Negative for abdominal pain, blood in stool and constipation. Genitourinary: Positive for frequency. Negative for dysuria, flank pain, hematuria and urgency. Physical Assessment:   VS:    Vitals:    09/21/17 0738 09/21/17 0751   BP: 141/80 122/74   Pulse: 75    Resp: 18    Temp: 96.6 °F (35.9 °C)    TempSrc: Oral    SpO2: 96%    Weight: 229 lb 12.8 oz (104.2 kg)    Height: 6' 2\" (1.88 m)    PainSc:   0 - No pain        Physical Exam   Constitutional: He is oriented to person, place, and time. He appears well-developed and well-nourished. No distress. HENT:   Head: Normocephalic and atraumatic. Right Ear: External ear normal. A foreign body (cerumen) is present. Left Ear: External ear normal. A foreign body (cerumen) is present. Tympanic membrane is not injected, not scarred, not perforated, not erythematous, not retracted and not bulging. Tympanic membrane mobility is normal.  No middle ear effusion. No hemotympanum.    Mouth/Throat: Oropharynx is clear and moist.   + EAC 50% occluded with cerumen, which was disimpacted manually without complication   Eyes: EOM are normal. Pupils are equal, round, and reactive to light. Neck: Normal range of motion. Neck supple. Cardiovascular: Normal rate, regular rhythm and normal heart sounds. No murmur heard. Pulmonary/Chest: Effort normal and breath sounds normal. No respiratory distress. He has no wheezes. Abdominal: Soft. Bowel sounds are normal. There is no tenderness. There is no rebound and no CVA tenderness. Musculoskeletal: Normal range of motion. Neurological: He is alert and oriented to person, place, and time. No cranial nerve deficit. Skin: Skin is warm and dry. He is not diaphoretic. Psychiatric: He has a normal mood and affect. His behavior is normal. Judgment and thought content normal.   Nursing note reviewed.       Recent Labs & Imaging:     Recent Results (from the past 12 hour(s))   AMB POC URINALYSIS DIP STICK AUTO W/O MICRO    Collection Time: 09/21/17  7:52 AM   Result Value Ref Range    Color (UA POC) Yellow     Clarity (UA POC) Clear     Glucose (UA POC) Negative Negative    Bilirubin (UA POC) Negative Negative    Ketones (UA POC) Negative Negative    Specific gravity (UA POC) 1.010 1.001 - 1.035    Blood (UA POC) Negative Negative    pH (UA POC) 6.5 4.6 - 8.0    Protein (UA POC) Negative Negative mg/dL    Urobilinogen (UA POC) 0.2 mg/dL 0.2 - 1    Nitrites (UA POC) Negative Negative    Leukocyte esterase (UA POC) Negative Negative

## 2017-09-23 LAB
APPEARANCE UR: CLEAR
BACTERIA #/AREA URNS HPF: NORMAL /[HPF]
BACTERIA UR CULT: NO GROWTH
BILIRUB UR QL STRIP: NEGATIVE
CASTS URNS QL MICRO: NORMAL /LPF
COLOR UR: YELLOW
EPI CELLS #/AREA URNS HPF: NORMAL /HPF
GLUCOSE UR QL: NEGATIVE
HGB UR QL STRIP: NEGATIVE
KETONES UR QL STRIP: NEGATIVE
LEUKOCYTE ESTERASE UR QL STRIP: NEGATIVE
MICRO URNS: NORMAL
MICRO URNS: NORMAL
MUCOUS THREADS URNS QL MICRO: PRESENT
NITRITE UR QL STRIP: NEGATIVE
PH UR STRIP: 6.5 [PH] (ref 5–7.5)
PROT UR QL STRIP: NEGATIVE
RBC #/AREA URNS HPF: NORMAL /HPF
SP GR UR: 1.01 (ref 1–1.03)
UROBILINOGEN UR STRIP-MCNC: 0.2 MG/DL (ref 0.2–1)
WBC #/AREA URNS HPF: NORMAL /HPF

## 2019-03-12 NOTE — PROGRESS NOTES
PRE-VISIT PLANNING:     PATIENT NAME:  Artie Rangel   :  1970   PCP:  Bhavna López MD     Future Appointments   Date Time Provider Jodi Valdovinos   3/13/2019  9:00 AM Bhavna López MD [facility]          Artie Rangel is scheduled for an office visit with Wilman Dalton MD on 2019 for evaluation of groin pain. Encounter opened prior to visit to complete pre-visit planning. Last office visit with PCP was 17. Pending issues:  -- Hx of R scrotal pain since vasectomy performed in , followed by urology (Dr. Ganesh Rizvi and Dr. Rito Enriquez). Urology notes reviewed, most recently in late , offered surgical reversal and treatment and advised to manage with scrotal support, warm sitz baths and NSAIDS (Naproxen and Aleve) if not interested in surgical intervention    Health Maintenance Due   Topic Date Due    Influenza Age 5 to Adult  2018       Rooming Nurse:     -- Offer flu shot per office policy. Document in nursing note if patient declines (document reason) or if clinic is out of stock. If pt reports this season's (Aug 2018-2019) flu shot was administered elsewhere, note both date of admin and clinic/pharmacy that reportedly provided vaccine. --> done through work          Internal Medicine  Annual Preventive Visit  [facility] [place] [address] [facility], [address]  Phone [phone]; Fax [phone]    Date of Service:  2019  Patient's Name & :   Artie Rangel - 1970   Patient's PCP:  Bhavna López MD     SUBJECTIVE        Chief Complaint   Patient presents with    Physical       Artie Rangel is a 53 y.o. male presenting for follow up. 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 Stable. Has a lipoma on left mid back, noticed it about 2 years ago, off midline, become very sore with workouts.   Used to do a lot of heavy lifting, has mild pain from bilateral hernias (inguinal hernias s/p repair with mesh). 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 Does have urinary frequency in past few years. Also having sharp pain in rectal area occasionally at night that he attributes to prostate. Followed by urology. Work takes him to "[employer] regularly. Working out regularly, very active (wind surfing, snow boarding). Diet hasn't been as careful, has not been limiting carbs and has noticed weight gain. Would like to update labs. Oldest is going to [employer], youngest is 8. Hx of histoplasmosis/abnl CXR, asymptomatic. [facility] form to begin service connection application, pt previously noted  lost his medical records    Review of Systems   Constitutional: Negative for chills, diaphoresis, fever, malaise/fatigue and weight loss. Eyes: Negative for blurred vision and pain. Respiratory: Negative for cough, hemoptysis, sputum production, shortness of breath and wheezing. Cardiovascular: Negative for chest pain, palpitations, claudication and leg swelling. Gastrointestinal: Positive for abdominal pain (rectal pain intermittently). Negative for constipation, diarrhea, heartburn, nausea and vomiting. Genitourinary: Positive for frequency. +Right groin/scrotal pain, urinary hesitation   Musculoskeletal: Positive for joint pain and myalgias. Negative for back pain, falls and neck pain. Skin: Negative for itching and rash. +lipoma/skin growth on back    Neurological: Negative for dizziness, tremors, loss of consciousness, weakness and headaches. Psychiatric/Behavioral: Negative for depression. The patient is not nervous/anxious. ASSESSMENT & PLAN    The primary encounter diagnosis was Routine general medical examination at a health care facility.  Diagnoses of Vitamin D deficiency, Lower urinary tract symptoms (LUTS), Right groin pain, Rectal pain, Lipoma of torso, and Histoplasmosis were also pertinent to this visit. Well 53year old male, stable back pain, stable R groin pain, stable hx histoplasmosis/abnl CXR. [facility] form signed for patient, copy submitted for scanning - patient to call office if form needs to be faxed to [place]  Fasting labs next earliest convenience  Reports he had flu shot at work   Lifestyle recommendations (diet, exercise, weight management) reviewed with patient  Referral to urology for follow up groin pain, recent LUTS, fam hx of Prostate Cancer in [place], checking PSA today   Patient instructed to schedule preventive office visit in 12 months     Body mass index is 31.02 kg/m². I have reviewed/discussed the above normal BMI with the patient. I have recommended the following interventions: monitor weight . Weight loss, continue regular exercise, sustainable dietary changes to include reduction in processed carbs (sugary and starchy foods). Sherwin Blunt MD   3/13/2019 9:11 AM     OBJECTIVE    /80 (BP 1 Location: Right arm, BP Patient Position: Sitting)   Pulse 70   Temp 97.9 °F (36.6 °C) (Oral)   Resp 18   Ht 6' 2\" (1.88 m)   Wt 241 lb 9.6 oz (109.6 kg)   SpO2 97%   BMI 31.02 kg/m²     Physical Exam   Constitutional: He is oriented to person, place, and time. He appears well-developed and well-nourished. No distress. HENT:   Head: Normocephalic and atraumatic. Nose: Nose normal.   Mouth/Throat: Oropharynx is clear and moist.   Eyes: Conjunctivae and EOM are normal. Pupils are equal, round, and reactive to light. Right eye exhibits no discharge. Left eye exhibits no discharge. No scleral icterus. Neck: Normal range of motion. Neck supple. No JVD present. No thyromegaly present. Cardiovascular: Normal rate, regular rhythm, normal heart sounds and intact distal pulses. Exam reveals no gallop and no friction rub. No murmur heard. Pulmonary/Chest: Effort normal and breath sounds normal. No stridor. No respiratory distress.  He has no wheezes. He has no rales. He exhibits no tenderness. Abdominal: Soft. Bowel sounds are normal. He exhibits no distension and no mass. There is no tenderness. There is no rebound and no guarding. Musculoskeletal: Normal range of motion. He exhibits no edema or tenderness. No focal ttp over spiny processes, no spinal deformities or stepoffs    Lymphadenopathy:     He has no cervical adenopathy. Neurological: He is alert and oriented to person, place, and time. No cranial nerve deficit or sensory deficit. He exhibits normal muscle tone. Coordination normal.   Skin: Skin is warm and dry. No rash noted. He is not diaphoretic. No erythema. Psychiatric: He has a normal mood and affect. His behavior is normal. Judgment and thought content normal.   Nursing note and vitals reviewed. Additional History   Patient's Past Med Hx, Surg Hx, Soc Hx, Family Hx reviewed and updated. Current Outpatient Medications   Medication Sig    cholecalciferol (VITAMIN D3) 5,000 unit capsule Take 1 Cap by mouth daily. Indications: VITAMIN D DEFICIENCY     No current facility-administered medications for this visit. Allergies   Allergen Reactions    Shellfish Derived Anaphylaxis    Amoxicillin Nausea Only     Stomach pain and upset       Encounter Diagnoses:     Encounter Diagnoses     ICD-10-CM ICD-9-CM   1. Routine general medical examination at a health care facility Z00.00 V70.0   2. Vitamin D deficiency E55.9 268.9   3. Lower urinary tract symptoms (LUTS) R39.9 788.99   4. Right groin pain R10.31 789.09   5. Rectal pain K62.89 569.42   6. Lipoma of torso D17.1 214.1   7.  Histoplasmosis B39.9 115.90

## 2019-03-13 ENCOUNTER — OFFICE VISIT (OUTPATIENT)
Dept: FAMILY MEDICINE CLINIC | Age: 49
End: 2019-03-13

## 2019-03-13 VITALS
TEMPERATURE: 97.9 F | DIASTOLIC BLOOD PRESSURE: 80 MMHG | HEIGHT: 74 IN | RESPIRATION RATE: 18 BRPM | OXYGEN SATURATION: 97 % | WEIGHT: 241.6 LBS | HEART RATE: 70 BPM | BODY MASS INDEX: 31.01 KG/M2 | SYSTOLIC BLOOD PRESSURE: 138 MMHG

## 2019-03-13 DIAGNOSIS — R39.9 LOWER URINARY TRACT SYMPTOMS (LUTS): ICD-10-CM

## 2019-03-13 DIAGNOSIS — Z00.00 ROUTINE GENERAL MEDICAL EXAMINATION AT A HEALTH CARE FACILITY: Primary | ICD-10-CM

## 2019-03-13 DIAGNOSIS — R10.31 RIGHT GROIN PAIN: ICD-10-CM

## 2019-03-13 DIAGNOSIS — E55.9 VITAMIN D DEFICIENCY: ICD-10-CM

## 2019-03-13 DIAGNOSIS — D17.1 LIPOMA OF TORSO: ICD-10-CM

## 2019-03-13 DIAGNOSIS — K62.89 RECTAL PAIN: ICD-10-CM

## 2019-03-13 DIAGNOSIS — B39.9 HISTOPLASMOSIS: Chronic | ICD-10-CM

## 2019-03-13 NOTE — PROGRESS NOTES
Dariela Higgins is a 53 y.o. male (: 1970) presenting to     Chief Complaint   Patient presents with    Physical       Vitals:    19 0905   BP: 138/80   Pulse: 70   Resp: 18   Temp: 97.9 °F (36.6 °C)   TempSrc: Oral   SpO2: 97%   Weight: 241 lb 9.6 oz (109.6 kg)   Height: 6' 2\" (1.88 m)   PainSc:   0 - No pain       Hearing/Vision:   No exam data present    Learning Assessment:     Learning Assessment 2015   PRIMARY LEARNER Patient   HIGHEST LEVEL OF EDUCATION - PRIMARY LEARNER  > 4 YEARS OF COLLEGE   BARRIERS PRIMARY LEARNER NONE   CO-LEARNER CAREGIVER No   PRIMARY LANGUAGE ENGLISH    NEED No   LEARNER PREFERENCE PRIMARY READING   LEARNING SPECIAL TOPICS none   ANSWERED BY patient   RELATIONSHIP SELF   ASSESSMENT COMMENT n/a     Depression Screening:     3 most recent PHQ Screens 2017   Little interest or pleasure in doing things Not at all   Feeling down, depressed, irritable, or hopeless Not at all   Total Score PHQ 2 0     Fall Risk Assessment:     Fall Risk Assessment, last 12 mths 2017   Able to walk? Yes   Fall in past 12 months? No     Abuse Screening:     Abuse Screening Questionnaire 2017   Do you ever feel afraid of your partner? N   Are you in a relationship with someone who physically or mentally threatens you? N   Is it safe for you to go home? Y     Coordination of Care Questionaire:   1. Have you been to the ER, urgent care clinic since your last visit? Hospitalized since your last visit? no    2. Have you seen or consulted any other health care providers outside of the [address] [place] [address] since your last visit? Include any pap smears or colon screening. no    Advanced Directive:   1. Do you have an Advanced Directive? NO    2. Would you like information on Advanced Directives? NO    Health Maintenance Due   Topic Date Due    Influenza Age 5 to Adult  2018     Doesn't want to get one since flu season almost over.

## 2019-03-13 NOTE — PATIENT INSTRUCTIONS
Fasting labs today     Routine follow up with Neftaly Booker MD annually for preventive visit, sooner PRN     Lab hours at [facility] are 7:30am-3pm Monday-Friday. Check in 15 minutes prior to your scheduled appointment time. Call and request an earlier appointment if needed to address any questions or concerns . TESTING RESULTS   Results will be released to [facility]. Normal results will be sent via mail. If you have questions about your results, please schedule a follow up appointment to discuss with your PCP. MEDICATION REFILLS    -- Please allow at least 2 business days for refill requests to be addressed. Medication refills may be requested through your pharmacy. Refills will not be provided by the after hours/on call provider.

## 2019-03-21 ENCOUNTER — HOSPITAL ENCOUNTER (OUTPATIENT)
Dept: LAB | Age: 49
Discharge: HOME | End: 2019-03-21
Payer: OTHER GOVERNMENT

## 2019-03-21 DIAGNOSIS — R39.9 LOWER URINARY TRACT SYMPTOMS (LUTS): ICD-10-CM

## 2019-03-21 DIAGNOSIS — Z00.00 ROUTINE GENERAL MEDICAL EXAMINATION AT A HEALTH CARE FACILITY: ICD-10-CM

## 2019-03-21 LAB
25(OH)D3 SERPL-MCNC: 39.9 NG/ML (ref 30–100)
ALBUMIN SERPL-MCNC: 4.7 G/DL (ref 3.4–5)
ALBUMIN/GLOB SERPL: 1.6 {RATIO} (ref 0.8–1.7)
ALP SERPL-CCNC: 74 U/L (ref 45–117)
ALT SERPL-CCNC: 35 U/L (ref 16–61)
ANION GAP SERPL CALC-SCNC: 6 MMOL/L (ref 3–18)
APPEARANCE UR: CLEAR
AST SERPL-CCNC: 22 U/L (ref 15–37)
BASOPHILS # BLD: 0 K/UL (ref 0–0.1)
BASOPHILS NFR BLD: 0 % (ref 0–2)
BILIRUB SERPL-MCNC: 0.7 MG/DL (ref 0.2–1)
BILIRUB UR QL: NEGATIVE
BUN SERPL-MCNC: 15 MG/DL (ref 7–18)
BUN/CREAT SERPL: 20 (ref 12–20)
CALCIUM SERPL-MCNC: 8.9 MG/DL (ref 8.5–10.1)
CHLORIDE SERPL-SCNC: 105 MMOL/L (ref 100–108)
CHOLEST SERPL-MCNC: 176 MG/DL
CO2 SERPL-SCNC: 27 MMOL/L (ref 21–32)
COLOR UR: YELLOW
CREAT SERPL-MCNC: 0.74 MG/DL (ref 0.6–1.3)
DIFFERENTIAL METHOD BLD: ABNORMAL
EOSINOPHIL # BLD: 0.2 K/UL (ref 0–0.4)
EOSINOPHIL NFR BLD: 4 % (ref 0–5)
ERYTHROCYTE [DISTWIDTH] IN BLOOD BY AUTOMATED COUNT: 13.6 % (ref 11.6–14.5)
EST. AVERAGE GLUCOSE BLD GHB EST-MCNC: 108 MG/DL
GLOBULIN SER CALC-MCNC: 2.9 G/DL (ref 2–4)
GLUCOSE SERPL-MCNC: 92 MG/DL (ref 74–99)
GLUCOSE UR STRIP.AUTO-MCNC: NEGATIVE MG/DL
HBA1C MFR BLD: 5.4 % (ref 4.2–5.6)
HCT VFR BLD AUTO: 46 % (ref 36–48)
HDLC SERPL-MCNC: 52 MG/DL (ref 40–60)
HDLC SERPL: 3.4 {RATIO} (ref 0–5)
HGB BLD-MCNC: 15 G/DL (ref 13–16)
HGB UR QL STRIP: NEGATIVE
KETONES UR QL STRIP.AUTO: NEGATIVE MG/DL
LDLC SERPL CALC-MCNC: 100 MG/DL (ref 0–100)
LEUKOCYTE ESTERASE UR QL STRIP.AUTO: NEGATIVE
LIPID PROFILE,FLP: NORMAL
LYMPHOCYTES # BLD: 1.8 K/UL (ref 0.9–3.6)
LYMPHOCYTES NFR BLD: 36 % (ref 21–52)
MCH RBC QN AUTO: 29.6 PG (ref 24–34)
MCHC RBC AUTO-ENTMCNC: 32.6 G/DL (ref 31–37)
MCV RBC AUTO: 90.9 FL (ref 74–97)
MONOCYTES # BLD: 0.6 K/UL (ref 0.05–1.2)
MONOCYTES NFR BLD: 13 % (ref 3–10)
NEUTS SEG # BLD: 2.3 K/UL (ref 1.8–8)
NEUTS SEG NFR BLD: 47 % (ref 40–73)
NITRITE UR QL STRIP.AUTO: NEGATIVE
PH UR STRIP: 7 [PH] (ref 5–8)
PLATELET # BLD AUTO: 162 K/UL (ref 135–420)
PMV BLD AUTO: 12.4 FL (ref 9.2–11.8)
POTASSIUM SERPL-SCNC: 4.3 MMOL/L (ref 3.5–5.5)
PROT SERPL-MCNC: 7.6 G/DL (ref 6.4–8.2)
PROT UR STRIP-MCNC: NEGATIVE MG/DL
RBC # BLD AUTO: 5.06 M/UL (ref 4.7–5.5)
SODIUM SERPL-SCNC: 138 MMOL/L (ref 136–145)
SP GR UR REFRACTOMETRY: 1.01 (ref 1–1.03)
TRIGL SERPL-MCNC: 120 MG/DL (ref ?–150)
UROBILINOGEN UR QL STRIP.AUTO: 0.2 EU/DL (ref 0.2–1)
VLDLC SERPL CALC-MCNC: 24 MG/DL
WBC # BLD AUTO: 4.9 K/UL (ref 4.6–13.2)

## 2019-03-21 PROCEDURE — 80061 LIPID PANEL: CPT

## 2019-03-21 PROCEDURE — 82306 VITAMIN D 25 HYDROXY: CPT

## 2019-03-21 PROCEDURE — 83036 HEMOGLOBIN GLYCOSYLATED A1C: CPT

## 2019-03-21 PROCEDURE — 36415 COLL VENOUS BLD VENIPUNCTURE: CPT

## 2019-03-21 PROCEDURE — 85025 COMPLETE CBC W/AUTO DIFF WBC: CPT

## 2019-03-21 PROCEDURE — 80053 COMPREHEN METABOLIC PANEL: CPT

## 2019-03-21 PROCEDURE — 81003 URINALYSIS AUTO W/O SCOPE: CPT

## 2019-03-21 PROCEDURE — 84439 ASSAY OF FREE THYROXINE: CPT

## 2019-03-21 PROCEDURE — 84153 ASSAY OF PSA TOTAL: CPT

## 2019-03-22 LAB
PSA SERPL-MCNC: 1.1 NG/ML (ref 0–4)
REFLEX CRITERIA: NORMAL
T4 FREE SERPL-MCNC: 1.2 NG/DL (ref 0.7–1.5)
TSH SERPL DL<=0.05 MIU/L-ACNC: 0.83 UIU/ML (ref 0.36–3.74)

## 2022-03-19 PROBLEM — D17.1 LIPOMA OF BACK: Status: ACTIVE | Noted: 2017-01-26

## 2022-03-19 PROBLEM — S46.119A TRAUMATIC PARTIAL TEAR OF BICEPS TENDON: Status: ACTIVE | Noted: 2017-05-04

## 2024-08-05 ENCOUNTER — TELEPHONE (OUTPATIENT)
Dept: FAMILY MEDICINE CLINIC | Facility: CLINIC | Age: 54
End: 2024-08-05

## 2024-08-07 ENCOUNTER — HOSPITAL ENCOUNTER (OUTPATIENT)
Facility: HOSPITAL | Age: 54
Setting detail: SPECIMEN
Discharge: HOME | End: 2024-08-10
Payer: OTHER GOVERNMENT

## 2024-08-07 ENCOUNTER — TELEPHONE (OUTPATIENT)
Dept: FAMILY MEDICINE CLINIC | Facility: CLINIC | Age: 54
End: 2024-08-07

## 2024-08-07 ENCOUNTER — OFFICE VISIT (OUTPATIENT)
Dept: FAMILY MEDICINE CLINIC | Facility: CLINIC | Age: 54
End: 2024-08-07
Payer: OTHER GOVERNMENT

## 2024-08-07 VITALS
OXYGEN SATURATION: 96 % | RESPIRATION RATE: 15 BRPM | TEMPERATURE: 97.3 F | HEART RATE: 70 BPM | DIASTOLIC BLOOD PRESSURE: 84 MMHG | SYSTOLIC BLOOD PRESSURE: 134 MMHG | BODY MASS INDEX: 32.13 KG/M2 | HEIGHT: 75 IN | WEIGHT: 258.4 LBS

## 2024-08-07 DIAGNOSIS — Z86.19 HISTORY OF HISTOPLASMOSIS: ICD-10-CM

## 2024-08-07 DIAGNOSIS — I10 PRIMARY HYPERTENSION: ICD-10-CM

## 2024-08-07 DIAGNOSIS — Z12.11 ENCOUNTER FOR SCREENING FOR MALIGNANT NEOPLASM OF COLON: ICD-10-CM

## 2024-08-07 DIAGNOSIS — Z00.00 ENCOUNTER FOR WELL ADULT EXAM WITHOUT ABNORMAL FINDINGS: ICD-10-CM

## 2024-08-07 DIAGNOSIS — Z12.5 ENCOUNTER FOR SCREENING FOR MALIGNANT NEOPLASM OF PROSTATE: ICD-10-CM

## 2024-08-07 DIAGNOSIS — Z00.00 ENCOUNTER FOR WELL ADULT EXAM WITHOUT ABNORMAL FINDINGS: Primary | ICD-10-CM

## 2024-08-07 LAB
ALBUMIN SERPL-MCNC: 4.1 G/DL (ref 3.4–5)
ALBUMIN/GLOB SERPL: 1.3 (ref 0.8–1.7)
ALP SERPL-CCNC: 69 U/L (ref 45–117)
ALT SERPL-CCNC: 33 U/L (ref 16–61)
ANION GAP SERPL CALC-SCNC: 6 MMOL/L (ref 3–18)
AST SERPL-CCNC: 19 U/L (ref 10–38)
BASOPHILS # BLD: 0 K/UL (ref 0–0.1)
BASOPHILS NFR BLD: 1 % (ref 0–2)
BILIRUB SERPL-MCNC: 0.8 MG/DL (ref 0.2–1)
BUN SERPL-MCNC: 14 MG/DL (ref 7–18)
BUN/CREAT SERPL: 19 (ref 12–20)
CALCIUM SERPL-MCNC: 9.6 MG/DL (ref 8.5–10.1)
CHLORIDE SERPL-SCNC: 106 MMOL/L (ref 100–111)
CHOLEST SERPL-MCNC: 168 MG/DL
CO2 SERPL-SCNC: 25 MMOL/L (ref 21–32)
CREAT SERPL-MCNC: 0.72 MG/DL (ref 0.6–1.3)
CREAT UR-MCNC: 21 MG/DL (ref 30–125)
DIFFERENTIAL METHOD BLD: ABNORMAL
EOSINOPHIL # BLD: 0.2 K/UL (ref 0–0.4)
EOSINOPHIL NFR BLD: 3 % (ref 0–5)
ERYTHROCYTE [DISTWIDTH] IN BLOOD BY AUTOMATED COUNT: 13.4 % (ref 11.6–14.5)
EST. AVERAGE GLUCOSE BLD GHB EST-MCNC: 108 MG/DL
GLOBULIN SER CALC-MCNC: 3.1 G/DL (ref 2–4)
GLUCOSE SERPL-MCNC: 102 MG/DL (ref 74–99)
HBA1C MFR BLD: 5.4 % (ref 4.2–5.6)
HCT VFR BLD AUTO: 45.9 % (ref 36–48)
HDLC SERPL-MCNC: 54 MG/DL (ref 40–60)
HDLC SERPL: 3.1 (ref 0–5)
HGB BLD-MCNC: 15.4 G/DL (ref 13–16)
IMM GRANULOCYTES # BLD AUTO: 0 K/UL (ref 0–0.04)
IMM GRANULOCYTES NFR BLD AUTO: 0 % (ref 0–0.5)
LDLC SERPL CALC-MCNC: 87 MG/DL (ref 0–100)
LIPID PANEL: NORMAL
LYMPHOCYTES # BLD: 1.8 K/UL (ref 0.9–3.6)
LYMPHOCYTES NFR BLD: 38 % (ref 21–52)
MCH RBC QN AUTO: 29.8 PG (ref 24–34)
MCHC RBC AUTO-ENTMCNC: 33.6 G/DL (ref 31–37)
MCV RBC AUTO: 88.8 FL (ref 78–100)
MICROALBUMIN UR-MCNC: <0.5 MG/DL (ref 0–3)
MICROALBUMIN/CREAT UR-RTO: ABNORMAL MG/G (ref 0–30)
MONOCYTES # BLD: 0.5 K/UL (ref 0.05–1.2)
MONOCYTES NFR BLD: 10 % (ref 3–10)
NEUTS SEG # BLD: 2.4 K/UL (ref 1.8–8)
NEUTS SEG NFR BLD: 49 % (ref 40–73)
NRBC # BLD: 0 K/UL (ref 0–0.01)
NRBC BLD-RTO: 0 PER 100 WBC
PLATELET # BLD AUTO: 163 K/UL (ref 135–420)
PMV BLD AUTO: 13 FL (ref 9.2–11.8)
POTASSIUM SERPL-SCNC: 4.2 MMOL/L (ref 3.5–5.5)
PROT SERPL-MCNC: 7.2 G/DL (ref 6.4–8.2)
PSA SERPL-MCNC: 0.9 NG/ML (ref 0–4)
RBC # BLD AUTO: 5.17 M/UL (ref 4.35–5.65)
SODIUM SERPL-SCNC: 137 MMOL/L (ref 136–145)
TRIGL SERPL-MCNC: 135 MG/DL
VLDLC SERPL CALC-MCNC: 27 MG/DL
WBC # BLD AUTO: 4.8 K/UL (ref 4.6–13.2)

## 2024-08-07 PROCEDURE — 85025 COMPLETE CBC W/AUTO DIFF WBC: CPT

## 2024-08-07 PROCEDURE — 80053 COMPREHEN METABOLIC PANEL: CPT

## 2024-08-07 PROCEDURE — 3075F SYST BP GE 130 - 139MM HG: CPT | Performed by: STUDENT IN AN ORGANIZED HEALTH CARE EDUCATION/TRAINING PROGRAM

## 2024-08-07 PROCEDURE — 99396 PREV VISIT EST AGE 40-64: CPT | Performed by: STUDENT IN AN ORGANIZED HEALTH CARE EDUCATION/TRAINING PROGRAM

## 2024-08-07 PROCEDURE — 3079F DIAST BP 80-89 MM HG: CPT | Performed by: STUDENT IN AN ORGANIZED HEALTH CARE EDUCATION/TRAINING PROGRAM

## 2024-08-07 PROCEDURE — 82043 UR ALBUMIN QUANTITATIVE: CPT

## 2024-08-07 PROCEDURE — 82570 ASSAY OF URINE CREATININE: CPT

## 2024-08-07 PROCEDURE — 80061 LIPID PANEL: CPT

## 2024-08-07 PROCEDURE — G0103 PSA SCREENING: HCPCS

## 2024-08-07 PROCEDURE — 83036 HEMOGLOBIN GLYCOSYLATED A1C: CPT

## 2024-08-07 RX ORDER — LOSARTAN POTASSIUM 25 MG/1
25 TABLET ORAL DAILY
Qty: 90 TABLET | Refills: 1 | Status: SHIPPED | OUTPATIENT
Start: 2024-08-07

## 2024-08-07 SDOH — ECONOMIC STABILITY: FOOD INSECURITY: WITHIN THE PAST 12 MONTHS, THE FOOD YOU BOUGHT JUST DIDN'T LAST AND YOU DIDN'T HAVE MONEY TO GET MORE.: NEVER TRUE

## 2024-08-07 SDOH — ECONOMIC STABILITY: FOOD INSECURITY: WITHIN THE PAST 12 MONTHS, YOU WORRIED THAT YOUR FOOD WOULD RUN OUT BEFORE YOU GOT MONEY TO BUY MORE.: NEVER TRUE

## 2024-08-07 SDOH — ECONOMIC STABILITY: HOUSING INSECURITY
IN THE LAST 12 MONTHS, WAS THERE A TIME WHEN YOU DID NOT HAVE A STEADY PLACE TO SLEEP OR SLEPT IN A SHELTER (INCLUDING NOW)?: NO

## 2024-08-07 SDOH — ECONOMIC STABILITY: INCOME INSECURITY: HOW HARD IS IT FOR YOU TO PAY FOR THE VERY BASICS LIKE FOOD, HOUSING, MEDICAL CARE, AND HEATING?: NOT HARD AT ALL

## 2024-08-07 ASSESSMENT — ENCOUNTER SYMPTOMS
DIARRHEA: 0
CHEST TIGHTNESS: 0
EYE PAIN: 0
SORE THROAT: 0
BACK PAIN: 0
NAUSEA: 0
VOMITING: 0
COUGH: 0
CONSTIPATION: 0
ABDOMINAL PAIN: 0
RHINORRHEA: 0
SHORTNESS OF BREATH: 0

## 2024-08-07 ASSESSMENT — PATIENT HEALTH QUESTIONNAIRE - PHQ9
SUM OF ALL RESPONSES TO PHQ QUESTIONS 1-9: 0
1. LITTLE INTEREST OR PLEASURE IN DOING THINGS: NOT AT ALL
SUM OF ALL RESPONSES TO PHQ QUESTIONS 1-9: 0
2. FEELING DOWN, DEPRESSED OR HOPELESS: NOT AT ALL
SUM OF ALL RESPONSES TO PHQ QUESTIONS 1-9: 0
SUM OF ALL RESPONSES TO PHQ QUESTIONS 1-9: 0
SUM OF ALL RESPONSES TO PHQ9 QUESTIONS 1 & 2: 0

## 2024-08-07 NOTE — PROGRESS NOTES
[facility]      MR#: [id number]    HISTORY OF PRESENT ILLNESS  History of Present Illness  The patient is a 54-year-old male who presents to [facility].    He reports overall good health but acknowledges a need for regular check-ups. As a  for [employer], he undergoes biannual physicals and annual EKGs.    He has experienced weight gain during the COVID-19 pandemic, reaching over 220 pounds despite numerous diet attempts. He believes his blood pressure would improve with weight loss. His [facility] physician has recommended blood pressure medication due to readings in the 140s. He admits to overeating and consuming beer a few times a week but maintains an active lifestyle, walking 8 miles daily and engaging in swimming and weight lifting.    He has not been screened for kidney or cholesterol issues and is interested in comprehensive lab work. He also expresses interest in colon cancer screening due to his sister's diagnosis.    He typically avoids influenza vaccines and has contracted COVID-19 three times, which significantly impacted his health for 1.5 to 2 years.    He experiences frequent urination, waking hourly at night, and often needs to urinate twice to fully empty his bladder. He reports no issues with urinary stream or dribbling.    He had shingles last year, which presented as constant chest pain and elevated blood pressure (170). He was treated with shingles medication and advised to seek cardiac evaluation.    He has a history of histoplasmosis and was advised to have chest x-rays every 5 to 10 years for comparison. His last x-ray was in the mid-s, revealing significant involvement on his left side. No treatment was pursued for this condition.    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    FAMILY HISTORY  His sister had colon cancer and is finishing chemotherapy now. His mother has type 1 diabetes. His oldest sister  of MS last year or the year before that. His

## 2024-08-07 NOTE — PROGRESS NOTES
Galen Rolle is a 54 y.o. male (: 1970) presenting to address:    Chief Complaint   Patient presents with    New Patient       Vitals:    24 0826   BP: 134/84   Pulse: 70   Resp: 15   Temp: 97.3 °F (36.3 °C)   SpO2: 96%       \"Have you been to the ER, urgent care clinic since your last visit?  Hospitalized since your last visit?\"    NO    “Have you seen or consulted any other health care providers outside of [facility] since your last visit?”    NO    “Have you had a colorectal cancer screening such as a colonoscopy/FIT/Cologuard?    NO    No colonoscopy on file  No cologuard on file  No FIT/FOBT on file   No flexible sigmoidoscopy on file

## 2024-08-07 NOTE — TELEPHONE ENCOUNTER
BENEFICIARY IS ENROLLED IN [facility] AND A REFERRAL FROM THEIR ASSIGNED PRIMARY CARE MANAGER IS REQUIRED. PLEASE HAVE THE BENEFICIARY OBTAIN A REFERRAL FROM THEIR PCM. IF PT NEEDS TO UPDATE THEIR PCM INFORMATION, PLEASE HAVE THEM CALL [facility] [phone] AND SELECT THE ENROLLMENT OPTION. THANK YOU.     Spoke with patient and advised him of the above information. Requested a call back once he updates PCM.    JOSE FRANCISCO.